# Patient Record
Sex: MALE | Race: WHITE | URBAN - METROPOLITAN AREA
[De-identification: names, ages, dates, MRNs, and addresses within clinical notes are randomized per-mention and may not be internally consistent; named-entity substitution may affect disease eponyms.]

---

## 2018-05-23 ENCOUNTER — EMERGENCY (EMERGENCY)
Facility: HOSPITAL | Age: 73
LOS: 0 days | Discharge: HOME | End: 2018-05-23
Attending: EMERGENCY MEDICINE
Payer: MEDICARE

## 2018-05-23 VITALS
RESPIRATION RATE: 17 BRPM | HEART RATE: 84 BPM | OXYGEN SATURATION: 98 % | DIASTOLIC BLOOD PRESSURE: 78 MMHG | SYSTOLIC BLOOD PRESSURE: 142 MMHG

## 2018-05-23 VITALS
WEIGHT: 214.95 LBS | HEART RATE: 79 BPM | OXYGEN SATURATION: 96 % | TEMPERATURE: 98 F | DIASTOLIC BLOOD PRESSURE: 81 MMHG | HEIGHT: 68 IN | SYSTOLIC BLOOD PRESSURE: 158 MMHG | RESPIRATION RATE: 17 BRPM

## 2018-05-23 PROBLEM — Z00.00 ENCOUNTER FOR PREVENTIVE HEALTH EXAMINATION: Status: ACTIVE | Noted: 2018-05-23

## 2018-05-23 PROCEDURE — 99284 EMERGENCY DEPT VISIT MOD MDM: CPT

## 2018-05-23 PROCEDURE — 93971 EXTREMITY STUDY: CPT | Mod: 26

## 2018-05-23 RX ORDER — KETOROLAC TROMETHAMINE 30 MG/ML
60 SYRINGE (ML) INJECTION ONCE
Qty: 0 | Refills: 0 | Status: DISCONTINUED | OUTPATIENT
Start: 2018-05-23 | End: 2018-05-23

## 2018-05-23 RX ORDER — LIDOCAINE 4 G/100G
1 CREAM TOPICAL
Qty: 10 | Refills: 0 | OUTPATIENT
Start: 2018-05-23 | End: 2018-06-01

## 2018-05-23 RX ORDER — LIDOCAINE 4 G/100G
1 CREAM TOPICAL ONCE
Qty: 0 | Refills: 0 | Status: COMPLETED | OUTPATIENT
Start: 2018-05-23 | End: 2018-05-23

## 2018-05-23 RX ADMIN — Medication 60 MILLIGRAM(S): at 18:35

## 2018-05-23 RX ADMIN — LIDOCAINE 1 PATCH: 4 CREAM TOPICAL at 18:38

## 2018-05-23 NOTE — ED PROVIDER NOTE - OBJECTIVE STATEMENT
Pertinent PMH/PSH/FHx/SHx and Review of Systems contained within:  72M hx of bilateral hip replacements pw left thigh pain. patient was on a flight on thursday and then developed the pain friday. no nausea, vomiting, cp, sob, fever, ha, vision change. patient went to chiropracter and was told it might be blood clot so he should come in. patient has only been incing it for pain  Fh and Sh not otherwise contributory  ROS otherwise negative

## 2018-05-23 NOTE — ED PROVIDER NOTE - PHYSICAL EXAMINATION
Gen: Alert, NAD  Head: NC, AT   Eyes: PERRL, EOMI, normal lids/conjunctiva  ENT: normal hearing, patent oropharynx without erythema/exudate, uvula midline  Neck: supple, no tenderness, Trachea midline  Pulm: Bilateral BS, normal resp effort, no wheeze/stridor/retractions  CV: RRR, no M/R/G, 2+ radial and dp pulses bl, no edema  Abd: soft, NT/ND, +BS, no hepatosplenomegaly  Mskel: left media thigh ttp. no ctl spine ttp.   Skin: no rash, no bruising   Neuro: AAOx3, no sensory/motor deficits, CN 2-12 intact

## 2018-05-24 DIAGNOSIS — M79.652 PAIN IN LEFT THIGH: ICD-10-CM

## 2018-05-24 DIAGNOSIS — Z96.643 PRESENCE OF ARTIFICIAL HIP JOINT, BILATERAL: ICD-10-CM

## 2019-03-27 ENCOUNTER — APPOINTMENT (OUTPATIENT)
Dept: SPINE | Facility: CLINIC | Age: 74
End: 2019-03-27

## 2019-09-18 ENCOUNTER — APPOINTMENT (OUTPATIENT)
Dept: SPINE | Facility: CLINIC | Age: 74
End: 2019-09-18
Payer: MEDICARE

## 2019-09-18 VITALS
DIASTOLIC BLOOD PRESSURE: 82 MMHG | HEART RATE: 62 BPM | BODY MASS INDEX: 22.57 KG/M2 | OXYGEN SATURATION: 95 % | WEIGHT: 152.4 LBS | HEIGHT: 69 IN | SYSTOLIC BLOOD PRESSURE: 146 MMHG

## 2019-09-18 DIAGNOSIS — Z86.39 PERSONAL HISTORY OF OTHER ENDOCRINE, NUTRITIONAL AND METABOLIC DISEASE: ICD-10-CM

## 2019-09-18 DIAGNOSIS — Z78.9 OTHER SPECIFIED HEALTH STATUS: ICD-10-CM

## 2019-09-18 DIAGNOSIS — Z86.73 PERSONAL HISTORY OF TRANSIENT ISCHEMIC ATTACK (TIA), AND CEREBRAL INFARCTION W/OUT RESIDUAL DEFICITS: ICD-10-CM

## 2019-09-18 DIAGNOSIS — Z86.79 PERSONAL HISTORY OF OTHER DISEASES OF THE CIRCULATORY SYSTEM: ICD-10-CM

## 2019-09-18 PROCEDURE — 99203 OFFICE O/P NEW LOW 30 MIN: CPT

## 2019-09-18 RX ORDER — GLIPIZIDE 5 MG/1
5 TABLET ORAL
Refills: 0 | Status: ACTIVE | COMMUNITY

## 2019-09-18 RX ORDER — ATORVASTATIN CALCIUM 40 MG/1
40 TABLET, FILM COATED ORAL
Refills: 0 | Status: ACTIVE | COMMUNITY

## 2019-09-18 RX ORDER — LISINOPRIL 30 MG/1
TABLET ORAL
Refills: 0 | Status: ACTIVE | COMMUNITY

## 2019-09-18 RX ORDER — CLOPIDOGREL 75 MG/1
TABLET, FILM COATED ORAL
Refills: 0 | Status: ACTIVE | COMMUNITY

## 2019-09-18 RX ORDER — ZOLPIDEM TARTRATE 5 MG/1
5 TABLET, FILM COATED ORAL
Refills: 0 | Status: ACTIVE | COMMUNITY

## 2019-09-18 RX ORDER — SERTRALINE HYDROCHLORIDE 100 MG/1
100 TABLET, FILM COATED ORAL
Refills: 0 | Status: ACTIVE | COMMUNITY

## 2019-09-18 RX ORDER — METHOCARBAMOL 750 MG/1
750 TABLET, FILM COATED ORAL
Refills: 0 | Status: ACTIVE | COMMUNITY

## 2019-09-18 RX ORDER — ASPIRIN 81 MG
81 TABLET, DELAYED RELEASE (ENTERIC COATED) ORAL
Refills: 0 | Status: ACTIVE | COMMUNITY

## 2019-09-18 RX ORDER — GABAPENTIN 100 MG/1
100 CAPSULE ORAL
Refills: 0 | Status: ACTIVE | COMMUNITY

## 2019-09-18 RX ORDER — METOPROLOL TARTRATE 25 MG/1
25 TABLET, FILM COATED ORAL
Refills: 0 | Status: ACTIVE | COMMUNITY

## 2019-09-18 RX ORDER — VARDENAFIL 20 MG/1
20 TABLET, FILM COATED ORAL
Refills: 0 | Status: ACTIVE | COMMUNITY

## 2019-09-18 RX ORDER — METFORMIN HYDROCHLORIDE 500 MG/1
500 TABLET, COATED ORAL
Refills: 0 | Status: ACTIVE | COMMUNITY

## 2019-09-18 RX ORDER — CHOLECALCIFEROL (VITAMIN D3)
CRYSTALS MISCELLANEOUS
Refills: 0 | Status: ACTIVE | COMMUNITY

## 2019-09-18 NOTE — HISTORY OF PRESENT ILLNESS
[de-identified] : 74 year old who suffered a right sided stroke and left  sided weakness who was referred to the office for abnormal neuroimages.   He is unsure what findings were noted on the images .  He has symptoms of left sided weakness, neck and back pain.

## 2019-09-18 NOTE — REASON FOR VISIT
[New Patient Visit] : a new patient visit [Referred By: _________] : Patient was referred by MARICHUY [Other: _____] : [unfilled] [FreeTextEntry1] : Recent CVA and abnormal neuroimages

## 2019-09-18 NOTE — ASSESSMENT
[FreeTextEntry1] : Assess:\par Neck pain \par Cerebral Aneurysm vs. infundibulum \par Cord edema noted in cervical spine \par \par \par PLAN:\par Refer to Dr melendez for suspicion of aneurysm vs infundibulum if Left ICA\par MRI  of cervical spine w/wo contrast\par Bun and creat \par \par \par

## 2019-09-18 NOTE — HISTORY OF PRESENT ILLNESS
[de-identified] : 74 year old who suffered a right sided stroke and left  sided weakness who was referred to the office for abnormal neuroimages.   He is unsure what findings were noted on the images .  He has symptoms of left sided weakness, neck and back pain.

## 2019-09-20 ENCOUNTER — OUTPATIENT (OUTPATIENT)
Dept: OUTPATIENT SERVICES | Facility: HOSPITAL | Age: 74
LOS: 1 days | End: 2019-09-20
Payer: MEDICARE

## 2019-09-20 ENCOUNTER — APPOINTMENT (OUTPATIENT)
Dept: MRI IMAGING | Facility: CLINIC | Age: 74
End: 2019-09-20
Payer: MEDICARE

## 2019-09-20 DIAGNOSIS — M54.2 CERVICALGIA: ICD-10-CM

## 2019-09-20 PROCEDURE — 72156 MRI NECK SPINE W/O & W/DYE: CPT | Mod: 26

## 2019-09-20 PROCEDURE — 72156 MRI NECK SPINE W/O & W/DYE: CPT

## 2019-09-20 PROCEDURE — A9585: CPT

## 2019-10-30 ENCOUNTER — APPOINTMENT (OUTPATIENT)
Dept: SPINE | Facility: CLINIC | Age: 74
End: 2019-10-30
Payer: MEDICARE

## 2019-10-30 VITALS
WEIGHT: 152 LBS | DIASTOLIC BLOOD PRESSURE: 78 MMHG | SYSTOLIC BLOOD PRESSURE: 146 MMHG | HEIGHT: 69 IN | BODY MASS INDEX: 22.51 KG/M2

## 2019-10-30 DIAGNOSIS — G95.9 DISEASE OF SPINAL CORD, UNSPECIFIED: ICD-10-CM

## 2019-10-30 DIAGNOSIS — M54.2 CERVICALGIA: ICD-10-CM

## 2019-10-30 DIAGNOSIS — Z87.891 PERSONAL HISTORY OF NICOTINE DEPENDENCE: ICD-10-CM

## 2019-10-30 PROCEDURE — 99214 OFFICE O/P EST MOD 30 MIN: CPT

## 2019-10-30 NOTE — REVIEW OF SYSTEMS
[Hand Weakness] :  hand weakness [Poor Coordination] : poor coordination [Numbness] : numbness [Difficulty Walking] : difficulty walking [Negative] : Heme/Lymph [de-identified] : neck and lower back pain , bilateral  hand weakness  [FreeTextEntry1] : No urine or bowel symptom s

## 2019-10-30 NOTE — ASSESSMENT
[FreeTextEntry1] : Mr. Mcghee is a 74 year old male with a recent onset of a CVA one month ago and right sided weakness.  He was referred to the office for hand weakness and MRI findings.   Initially the MRI showed cord edema and a MRI with contrast was completed and reviewed at this visit.  The MRI findings are consistent with moderate foraminal stenosis at C 3 C 4 C 5 C 6 and moderate spondylosis.  There is no abnormal enhancement  in the spinal cord or epidural space.  His symptoms are stable since his last examination one month ago.  He has bilateral hand weakness and gait dysfunction.  He was encouraged to return in  the next week to see Dr. Moran to discuss  treatment options and surgery recommendation, however the patient was instructed by his cardiologist to avoid surgery due to his recent CVA and wait at least three months for any surgical intervention.  He is under a cardiologists care and will be undergoing additional testing.  A discussion took place about his hand weakness and how vital it is to follow up in the office due to permanent damage that could occur from no cervical spine intervention.  Undergoing intervention for his cervical myelopathy may delay the progression of his weakness and he understood.  He will maintain his current exercise program for his hands.  Mr. Saab has not followed up wit Dr Molina, a neurovascular surgeon as recommended.  It was reinforced that his brain MRI showed an abnormal vessel at the level of the left ICA and this requires a specialists follow up.   If his weakness worsens or he experiences worse pain and gait abnormality he was instructed to contact the office ASAP.  He is eager to return to his home in [place] for  the winter and he understands the risks and will follow up in March 2020 when he returns to [place].

## 2019-10-30 NOTE — PHYSICAL EXAM
[General Appearance - Alert] : alert [General Appearance - In No Acute Distress] : in no acute distress [General Appearance - Well Nourished] : well nourished [General Appearance - Well Developed] : well developed [General Appearance - Well-Appearing] : healthy appearing [Oriented To Time, Place, And Person] : oriented to person, place, and time [Impaired Insight] : insight and judgment were intact [Affect] : the affect was normal [Mood] : the mood was normal [Person] : oriented to person [Place] : oriented to place [Time] : oriented to time [Short Term Intact] : short term memory intact [Remote Intact] : remote memory intact [Registration Intact] : recent registration memory intact [Involuntary Movements] : no involuntary movements were seen [Skin Color & Pigmentation] : normal skin color and pigmentation [FreeTextEntry6] : bilateral hand weakness, worse on the right side [FreeTextEntry8] : unsteady gait

## 2019-10-30 NOTE — REASON FOR VISIT
[Follow-Up: _____] : a [unfilled] follow-up visit [Other: _____] : [unfilled] [FreeTextEntry1] : 74 year old male with a recent one month history of a CVA and left sided weakness of UE who was referred here for cervical stenosis evaluation.  Mr. Ngo returns today with a cervical MRI with and without contrast after an area of concern was noted on his prior MRI for cord edema and cervical stenosis.  He remains with bilateral hand weakness and a gait disturbance. An incidental finding of a left ICA infundibulum vs. aneurysm  was identified on the brain image  from the previous visit.    In addition, Mr. Ngo has underlying CAD and a cardiac stent placed one year ago.

## 2019-10-30 NOTE — DATA REVIEWED
[de-identified] : MRI of cervical spine w/wo contrast from [facility]

## 2020-09-14 ENCOUNTER — TRANSCRIPTION ENCOUNTER (OUTPATIENT)
Age: 75
End: 2020-09-14

## 2024-06-07 NOTE — ED ADULT TRIAGE NOTE - PAIN RATING/NUMBER SCALE (0-10): REST
Treatment Goal Met?: no Treatment Goal Explanation (Does Not Render In The Note): Stable for the purposes of categorizing medical decision making is defined by the specific treatment goals for an individual patient. A patient that is not at their treatment goal is not stable, even if the condition has not changed and there is no short- term threat to life or function. 6